# Patient Record
Sex: MALE | Race: WHITE | NOT HISPANIC OR LATINO | Employment: FULL TIME | URBAN - NONMETROPOLITAN AREA
[De-identification: names, ages, dates, MRNs, and addresses within clinical notes are randomized per-mention and may not be internally consistent; named-entity substitution may affect disease eponyms.]

---

## 2017-02-17 ENCOUNTER — OFFICE VISIT (OUTPATIENT)
Dept: RETAIL CLINIC | Facility: CLINIC | Age: 32
End: 2017-02-17

## 2017-02-17 VITALS — HEART RATE: 116 BPM | RESPIRATION RATE: 16 BRPM | TEMPERATURE: 97.7 F | OXYGEN SATURATION: 98 %

## 2017-02-17 DIAGNOSIS — H92.01 OTALGIA OF RIGHT EAR: Primary | ICD-10-CM

## 2017-02-17 PROCEDURE — 99203 OFFICE O/P NEW LOW 30 MIN: CPT | Performed by: NURSE PRACTITIONER

## 2017-02-17 RX ORDER — CITALOPRAM 20 MG/1
20 TABLET ORAL DAILY
COMMUNITY

## 2017-02-17 NOTE — PROGRESS NOTES
Jaquan Stiles is a 32 y.o. male.     Earache    There is pain in the right ear. This is a new problem. The current episode started in the past 7 days. There has been no fever. The pain is mild. Associated symptoms include hearing loss. Pertinent negatives include no coughing, ear discharge, headaches, rash, rhinorrhea, sore throat or vomiting. He has tried nothing for the symptoms. history of earwax build up      Pt presents with c/o R ear pain and ear feeling like it is plugged.  C/o decreased hearing and hx of earwax build up.  States he cleans ears at home with over the counter solution for earwax.  Requesting ears to be checked to make sure he doesn't have an ear infection.       No current outpatient prescriptions on file prior to visit.     No current facility-administered medications on file prior to visit.        No Known Allergies    Past Medical History   Diagnosis Date   • Anxiety        History reviewed. No pertinent past surgical history.    Family History   Problem Relation Age of Onset   • Heart disease Father    • Lung disease Other        Social History     Social History   • Marital status: Single     Spouse name: N/A   • Number of children: N/A   • Years of education: N/A     Occupational History   • Not on file.     Social History Main Topics   • Smoking status: Current Every Day Smoker     Packs/day: 0.50     Years: 20.00   • Smokeless tobacco: Not on file   • Alcohol use No   • Drug use: No   • Sexual activity: Not on file     Other Topics Concern   • Not on file     Social History Narrative   • No narrative on file       Review of Systems   Constitutional: Negative for chills and fever.   HENT: Positive for ear pain and hearing loss. Negative for congestion, ear discharge, facial swelling, postnasal drip, rhinorrhea, sinus pressure and sore throat.    Respiratory: Negative for cough.    Cardiovascular: Negative for chest pain.   Gastrointestinal: Negative for nausea and  vomiting.   Musculoskeletal: Negative for myalgias.   Skin: Negative for rash.   Neurological: Negative for dizziness and headaches.       Visit Vitals   • Pulse 116   • Temp 97.7 °F (36.5 °C) (Oral)   • Resp 16   • SpO2 98%       Objective   Physical Exam   Constitutional: He is oriented to person, place, and time. He appears well-developed and well-nourished. He is cooperative. No distress.   HENT:   Head: Normocephalic and atraumatic.   Right Ear: No drainage, swelling or tenderness. Decreased hearing is noted.   Left Ear: No drainage, swelling or tenderness. Tympanic membrane is not erythematous. No decreased hearing is noted.   Nose: Nose normal. No mucosal edema or rhinorrhea.   Mouth/Throat: Uvula is midline, oropharynx is clear and moist and mucous membranes are normal. No oropharyngeal exudate, posterior oropharyngeal edema or posterior oropharyngeal erythema.   Large amt of soft yellow cerumen noted in bilateral canals, R TM not visualized   Neck: Normal range of motion. Neck supple.   Cardiovascular: Normal rate, regular rhythm, S1 normal, S2 normal and normal heart sounds.    Pulmonary/Chest: Effort normal and breath sounds normal. No respiratory distress.   Abdominal: Soft. Bowel sounds are normal.   Neurological: He is alert and oriented to person, place, and time.   Skin: Skin is warm, dry and intact. No rash noted.       No results found for this or any previous visit.      Assessment/Plan   Alex was seen today for earache.    Diagnoses and all orders for this visit:    Otalgia of right ear    Discussed exam findings and offered to irrigate and clean out ears but patient states can do it at home.

## 2017-02-17 NOTE — PATIENT INSTRUCTIONS
Earwax Buildup  Your ears make a substance called earwax. It may also be called cerumen. Sometimes, too much earwax builds up in your ear canal. This can cause ear pain and make it harder for you to hear.  CAUSES  This condition is caused by too much earwax production or buildup.  RISK FACTORS  The following factors may make you more likely to develop this condition:  · Cleaning your ears often with swabs.  · Having narrow ear canals.  · Having earwax that is overly thick or sticky.  · Having eczema.  · Being dehydrated.  SYMPTOMS  Symptoms of this condition include:  · Reduced hearing.  · Ear drainage.  · Ear pain.  · Ear itch.  · A feeling of fullness in the ear or feeling that the ear is plugged.  · Ringing in the ear.  · Coughing.  DIAGNOSIS  Your health care provider can diagnose this condition based on your symptoms and medical history. Your health care provider will also do an ear exam to look inside your ear with a scope (otoscope). You may also have a hearing test.  TREATMENT  Treatment for this condition includes:  · Over-the-counter or prescription ear drops to soften the earwax.  · Earwax removal by a health care provider. This may be done:    By flushing the ear with body-temperature water.    With a medical instrument that has a loop at the end (earwax curette).    With a suction device.  HOME CARE INSTRUCTIONS  · Take over-the-counter and prescription medicines only as told by your health care provider.  · Do not put any objects, including an ear swab, into your ear. You can clean the opening of your ear canal with a washcloth.  · Drink enough water to keep your urine clear or pale yellow.  · If you have frequent earwax buildup or you use hearing aids, consider seeing your health care provider every 6-12 months for routine preventive ear cleanings. Keep all follow-up visits as told by your health care provider.  SEEK MEDICAL CARE IF:  · You have ear pain.  · Your condition does not improve with  treatment.  · You have hearing loss.  You have blood, pus, or other fluid coming from your ear.      Use Aquafor for chapped lips daily   This information is not intended to replace advice given to you by your health care provider. Make sure you discuss any questions you have with your health care provider.     Document Released: 01/25/2006 Document Revised: 11/28/2016 Document Reviewed: 08/03/2016  [employer] Interactive Interactive Patient Education ©2016 [employer]

## 2023-01-09 VITALS
HEART RATE: 83 BPM | WEIGHT: 160 LBS | RESPIRATION RATE: 17 BRPM | OXYGEN SATURATION: 100 % | SYSTOLIC BLOOD PRESSURE: 123 MMHG | TEMPERATURE: 97.4 F | HEIGHT: 69 IN | BODY MASS INDEX: 23.7 KG/M2 | DIASTOLIC BLOOD PRESSURE: 84 MMHG

## 2023-01-09 PROCEDURE — 99283 EMERGENCY DEPT VISIT LOW MDM: CPT

## 2023-01-10 ENCOUNTER — HOSPITAL ENCOUNTER (EMERGENCY)
Facility: HOSPITAL | Age: 38
Discharge: HOME | End: 2023-01-10
Attending: STUDENT IN AN ORGANIZED HEALTH CARE EDUCATION/TRAINING PROGRAM | Admitting: STUDENT IN AN ORGANIZED HEALTH CARE EDUCATION/TRAINING PROGRAM
Payer: COMMERCIAL

## 2023-01-10 ENCOUNTER — APPOINTMENT (OUTPATIENT)
Dept: GENERAL RADIOLOGY | Facility: HOSPITAL | Age: 38
End: 2023-01-10
Payer: COMMERCIAL
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
## 2023-01-10 PROCEDURE — 73130 X-RAY EXAM OF HAND: CPT

## 2023-01-10 RX ORDER — DOXYCYCLINE 100 MG/1
100 CAPSULE ORAL 2 TIMES DAILY
Qty: 28 CAPSULE | Refills: 0 | Status: SHIPPED | OUTPATIENT
Start: 2023-01-10 | End: 2023-01-24

## 2023-01-10 RX ORDER — DOXYCYCLINE 100 MG/1
100 CAPSULE ORAL ONCE
Status: COMPLETED | OUTPATIENT
Start: 2023-01-10 | End: 2023-01-10

## 2023-01-10 RX ADMIN — DOXYCYCLINE 100 MG: 100 CAPSULE ORAL at 01:48

## 2023-01-10 NOTE — DISCHARGE INSTRUCTIONS
Please keep wound clean and dry.  Take doxycycline as prescribed and follow-up with your primary care doctor in 3 to 5 days for reevaluation.  Take Tylenol as needed for pain relief at home.  Return to the emergency department if you have worsening pain, thick yellow drainage, fevers.

## 2023-01-10 NOTE — ED PROVIDER NOTES
"Subjective   History of Present Illness  Alex Stiles is a 38-year-old male who presents to the ER for evaluation of a dog bite.  Patient states on Saturday morning he was trying to break up a fight between his 2 dogs when one of the dogs bit him on his right hand.  Patient states he is right-hand dominant and is up-to-date on his vaccinations.  Patient also endorses that this is his dog and the dog is up-to-date on its rabies vaccinations.  Patient endorses today he noticed some \"yellowing of my skin around the bite.\"  Patient denies further injury, numbness, tingling, fever, nausea, vomiting.            Review of Systems   Constitutional: Negative for chills, fatigue and fever.   HENT: Negative for congestion and sore throat.    Respiratory: Negative for shortness of breath.    Cardiovascular: Negative for chest pain.   Gastrointestinal: Negative for abdominal pain.   Genitourinary: Negative for dysuria.   Musculoskeletal: Negative for back pain.   Skin: Positive for wound. Negative for rash.   Neurological: Negative for headaches.   Psychiatric/Behavioral: Negative for agitation and confusion.   All other systems reviewed and are negative.      Past Medical History:   Diagnosis Date   • Anxiety        Allergies   Allergen Reactions   • Amoxicillin Itching       No past surgical history on file.    Family History   Problem Relation Age of Onset   • Heart disease Father    • Lung disease Other        Social History     Socioeconomic History   • Marital status: Single   Tobacco Use   • Smoking status: Every Day     Packs/day: 0.50     Years: 20.00     Pack years: 10.00     Types: Cigarettes   Substance and Sexual Activity   • Alcohol use: No   • Drug use: No           Objective   Physical Exam  Vitals and nursing note reviewed.   Constitutional:       Appearance: Normal appearance.   HENT:      Head: Normocephalic and atraumatic.      Mouth/Throat:      Mouth: Mucous membranes are moist.      Pharynx: Oropharynx " is clear.   Cardiovascular:      Rate and Rhythm: Normal rate and regular rhythm.      Pulses: Normal pulses.      Heart sounds: Normal heart sounds.   Pulmonary:      Effort: Pulmonary effort is normal.      Breath sounds: Normal breath sounds.   Abdominal:      Palpations: Abdomen is soft.      Tenderness: There is no abdominal tenderness.   Musculoskeletal:         General: Normal range of motion.      Cervical back: Normal range of motion and neck supple.   Skin:     General: Skin is warm and dry.      Capillary Refill: Capillary refill takes less than 2 seconds.      Findings: Wound present.      Comments: Small 0.5 cm puncture wound to the right hand between the first and second digit on the dorsal aspect of the hand with no surrounding erythema or purulent drainage, surrounding ecchymosis   Neurological:      General: No focal deficit present.      Mental Status: He is alert.   Psychiatric:         Mood and Affect: Mood normal.         Behavior: Behavior normal.         Procedures           ED Course  ED Course as of 01/10/23 0245   Tue Rayo 10, 2023   0202 XR Hand 3+ View Right  There may be some soft tissue swelling between the thumb and second digit. There is no discrete wound or soft tissue gas. No foreign body. No acute bony abnormality.  [MA]      ED Course User Index  [MA] Monica Barton PA-C                                           Medical Decision Making    Alex Stiles  is a 38 yrs old male presents today for evaluation of a dog bite.    Based on his history and physical exam, my differential diagnosis included several life threatening conditions including acute bony abnormality, retained foreign body, tendon injury, osteomyelitis. Ruling out the most morbid conditions drove my clinical assessment.     In order to fully explore differential these tests and treatments were ordered.   Orders Placed This Encounter      XR Hand 3+ View Right     Medications  doxycycline (MONODOX) capsule 100 mg  (100 mg Oral Given 1/10/23 0148)     Old records for this patient including outpatient visits were reviewed and found to be not pertinent.    I interpreted and clinically used the hand x-ray prior to its official reading.  No acute bony abnormality or retained foreign body on my read. Please see the interpretation for final read.     Patient reevaluated after treatments provided and condition was improved upon my reevaluation.    I had an interactive discussion with with the patient regarding his x-ray results.  Given that the patient is allergic to penicillins, patient was given a prescription for doxycycline at this time.  Advised patient to keep the wound clean and dry with soap and water and take Tylenol or Profen as needed for pain relief at home.    Based on work up today patient did not require consult with any service.    Ultimately, this patient was discharged.  DISCHARGE    Patient discharged in stable condition.    Reviewed implications of results, diagnosis, meds, responsibility to follow up, warning signs and symptoms of possible worsening, potential complications and reasons to return to ER.    Patient/Family voiced understanding of above instructions.    Discussed plan for discharge, as there is no emergent indication for admission.  Pt/family is agreeable and understands need for follow up and possible repeat testing.  Pt/family is aware that discharge does not mean that nothing is wrong but that it indicates no emergency is currently present that requires admission and they must continue care with follow-up as given below or with a physician of their choice.     FOLLOW-UP  Igor Hamilton MD[facility] [address] [place] [phone]       Medication List      New Prescriptions    doxycycline 100 MG capsule  Commonly known as: MONODOX  Take 1 capsule by mouth 2 (Two) Times a Day for 14 days.           Where to Get Your Medications      These medications were sent to [facility] [address] [place] [address],  [place] - [address] [place] [address] - [phone] [facility] [phone] FX  [address] [facility] [place] [address]    Phone: [phone]   doxycycline 100 MG capsule         Dog bite, initial encounter: acute illness or injury  Amount and/or Complexity of Data Reviewed  Radiology: ordered. Decision-making details documented in ED Course.      Risk  Prescription drug management.          Final diagnoses:   Dog bite, initial encounter       ED Disposition  ED Disposition     ED Disposition   Discharge    Condition   Stable    Comment   --             Igor Hamilton MD  [address]  [facility] [address] [phone]               Medication List      New Prescriptions    doxycycline 100 MG capsule  Commonly known as: MONODOX  Take 1 capsule by mouth 2 (Two) Times a Day for 14 days.           Where to Get Your Medications      These medications were sent to [id number] [address] - [phone] [facility] [phone] [address] [address]    Phone: [phone]   · doxycycline 100 MG capsule          Monica Barton PA-C  01/10/23 0245

## 2023-01-10 NOTE — Clinical Note
[facility] EMERGENCY DEPARTMENT  [address] [facility] [address]  Phone: [phone]    Alex Stiles was seen and treated in our emergency department on 1/9/2023.  He may return to work on 01/11/2023.         Thank you for choosing [facility].    Monica Barton PA-C